# Patient Record
Sex: FEMALE | Race: WHITE
[De-identification: names, ages, dates, MRNs, and addresses within clinical notes are randomized per-mention and may not be internally consistent; named-entity substitution may affect disease eponyms.]

---

## 2019-10-27 ENCOUNTER — HOSPITAL ENCOUNTER (EMERGENCY)
Dept: HOSPITAL 62 - ER | Age: 64
Discharge: HOME | End: 2019-10-27
Payer: COMMERCIAL

## 2019-10-27 VITALS — DIASTOLIC BLOOD PRESSURE: 83 MMHG | SYSTOLIC BLOOD PRESSURE: 140 MMHG

## 2019-10-27 DIAGNOSIS — Z79.82: ICD-10-CM

## 2019-10-27 DIAGNOSIS — Z79.84: ICD-10-CM

## 2019-10-27 DIAGNOSIS — F17.200: ICD-10-CM

## 2019-10-27 DIAGNOSIS — E11.9: ICD-10-CM

## 2019-10-27 DIAGNOSIS — R04.0: Primary | ICD-10-CM

## 2019-10-27 DIAGNOSIS — I50.9: ICD-10-CM

## 2019-10-27 DIAGNOSIS — Z88.2: ICD-10-CM

## 2019-10-27 DIAGNOSIS — I11.0: ICD-10-CM

## 2019-10-27 LAB
ADD MANUAL DIFF: NO
APTT BLD: 32.4 SEC (ref 23.5–35.8)
BASOPHILS # BLD AUTO: 0 10^3/UL (ref 0–0.2)
BASOPHILS NFR BLD AUTO: 0.4 % (ref 0–2)
EOSINOPHIL # BLD AUTO: 0.1 10^3/UL (ref 0–0.6)
EOSINOPHIL NFR BLD AUTO: 1.3 % (ref 0–6)
ERYTHROCYTE [DISTWIDTH] IN BLOOD BY AUTOMATED COUNT: 13.5 % (ref 11.5–14)
HCT VFR BLD CALC: 35.5 % (ref 36–47)
HGB BLD-MCNC: 11.7 G/DL (ref 12–15.5)
INR PPP: 0.93
LYMPHOCYTES # BLD AUTO: 3.2 10^3/UL (ref 0.5–4.7)
LYMPHOCYTES NFR BLD AUTO: 39.1 % (ref 13–45)
MCH RBC QN AUTO: 27.1 PG (ref 27–33.4)
MCHC RBC AUTO-ENTMCNC: 33 G/DL (ref 32–36)
MCV RBC AUTO: 82 FL (ref 80–97)
MONOCYTES # BLD AUTO: 0.7 10^3/UL (ref 0.1–1.4)
MONOCYTES NFR BLD AUTO: 8.3 % (ref 3–13)
NEUTROPHILS # BLD AUTO: 4.1 10^3/UL (ref 1.7–8.2)
NEUTS SEG NFR BLD AUTO: 50.9 % (ref 42–78)
PLATELET # BLD: 296 10^3/UL (ref 150–450)
PROTHROMBIN TIME: 12.5 SEC (ref 11.4–15.4)
RBC # BLD AUTO: 4.33 10^6/UL (ref 3.72–5.28)
TOTAL CELLS COUNTED % (AUTO): 100 %
WBC # BLD AUTO: 8.1 10^3/UL (ref 4–10.5)

## 2019-10-27 PROCEDURE — 85025 COMPLETE CBC W/AUTO DIFF WBC: CPT

## 2019-10-27 PROCEDURE — 36415 COLL VENOUS BLD VENIPUNCTURE: CPT

## 2019-10-27 PROCEDURE — 85610 PROTHROMBIN TIME: CPT

## 2019-10-27 PROCEDURE — 85730 THROMBOPLASTIN TIME PARTIAL: CPT

## 2019-10-27 PROCEDURE — 99283 EMERGENCY DEPT VISIT LOW MDM: CPT

## 2019-10-27 NOTE — ER DOCUMENT REPORT
ED ENT





- General


Chief Complaint: Nose Bleed


Stated Complaint: NOSE BLEED


Time Seen by Provider: 10/27/19 14:32


Notes: 





Patient is a 64-year-old female presents to the emergency department for a left 

nares epistaxis.  Patient states she has had "left nostril septal nose bleeds 

since I was 13 years old!"  States she does have home nasal clamps and "bleed-

EZ" which is an over-the-counter marketed product to stop bleeding. 


Stated she used both this afternoon when her nose started bleeding. Stated she 

could not get it to stop so 911 was called. Stated that once EMS arrived the 

bleeding stopped. Stated she was bleeding for apx 30 minutes. Stated she there 

were blood clots coming out of her mouth as well which is why she wanted to 

still be seen in the ED.


Stated she has not seen an ENT in this area. 


Patient denies headache, lightheadedness, dizziness, weakness, chest pain, 

shortness of breath.





PMH: CHF, DM, HTN, hyperlipidemia, anemia


Medications: Metoprolol, metformin, Januvia, aspirin, "some sort of statin."


Allergies: Sulfa





- Related Data


Allergies/Adverse Reactions: 


                                        





Sulfa (Sulfonamide Antibiotics) Allergy (Verified 10/27/19 14:32)


   











Past Medical History





- General


Information source: Patient





- Social History


Smoking Status: Current Every Day Smoker


Chew tobacco use (# tins/day): No


Frequency of alcohol use: Occasional


Drug Abuse: None


Family History: Reviewed & Not Pertinent


Patient has suicidal ideation: No


Patient has homicidal ideation: No





- Past Medical History


Cardiac Medical History: Reports: Hx Congestive Heart Failure, Hx Hypertension


Endocrine Medical History: Reports: Hx Diabetes Mellitus Type 2


Psychiatric Medical History: Reports: Hx Depression - and anxiety





Review of Systems





- Review of Systems


Constitutional: denies: Fever


EENT: See HPI


Cardiovascular: No symptoms reported.  denies: Syncope, Dizziness, Lightheaded


Respiratory: denies: Short of breath, Wheezing


Gastrointestinal: denies: Vomiting


Genitourinary: No symptoms reported


Female Genitourinary: No symptoms reported


Musculoskeletal: No symptoms reported


Skin: No symptoms reported


Hematologic/Lymphatic: No symptoms reported


Neurological/Psychological: No symptoms reported





Physical Exam





- Vital signs


Vitals: 


                                        











Temp Pulse Resp BP Pulse Ox


 


 97.9 F   85   20   168/90 H  100 


 


 10/27/19 14:31  10/27/19 14:31  10/27/19 14:31  10/27/19 14:31  10/27/19 14:31














- Notes


Notes: 





GENERAL: Alert, interacts well. No acute distress.


HEAD: Normocephalic, atraumatic.


EYES: Pupils equal, round, and reactive to light. Extraocular movements intact.


ENT: Oral mucosa moist, tongue midline.  There does appear to be a clot noted on

the left septum. No obvious bleeding at this time. Nares patent, patient's 

pharynx is within normal limits.   


NECK: Full range of motion. Supple. Trachea midline.


LUNGS: Clear to auscultation bilaterally, no wheezes, rales, or rhonchi. No 

respiratory distress.


HEART: Regular rate and rhythm. No murmur


ABDOMEN: Soft, non-tender. Non-distended. Bowel sounds present in all 4 

quadrants.


EXTREMITIES: Moves all 4 extremities spontaneously. No edema, normal radial and 

dorsalis pedis pulses bilaterally. No cyanosis.


BACK: no cervical, thoracic, lumbar midline tenderness. No saddle anesthesia, 

normal distal neurovascular exam. 


NEUROLOGICAL: Alert and oriented x3. Normal speech. cranial nerves II through 

XII grossly intact 


PSYCH: Normal affect, normal mood.


SKIN: Warm, dry, normal turgor. No rashes or lesions noted.








Course





- Re-evaluation


Re-evalutation: 





10/27/19 15:38





Laboratory











  10/27/19 10/27/19





  14:53 14:53


 


WBC  8.1 


 


RBC  4.33 


 


Hgb  11.7 L 


 


Hct  35.5 L 


 


MCV  82 


 


MCH  27.1 


 


MCHC  33.0 


 


RDW  13.5 


 


Plt Count  296 


 


Lymph % (Auto)  39.1 


 


Mono % (Auto)  8.3 


 


Eos % (Auto)  1.3 


 


Baso % (Auto)  0.4 


 


Absolute Neuts (auto)  4.1 


 


Absolute Lymphs (auto)  3.2 


 


Absolute Monos (auto)  0.7 


 


Absolute Eos (auto)  0.1 


 


Absolute Basos (auto)  0.0 


 


Seg Neutrophils %  50.9 


 


PT   12.5


 


INR   0.93


 


APTT   32.4








Patient's labs are not alarming.  Her hemoglobin is noted to be 11.7 but patient

voices she knows she has slight anemia.


Afrin has not been given yet, Pt wished to go buy Afrin OTC. I have talked about

risk vs benefits of Afrin use with patient at bedside.  Discussed to not using 

it for prolonged period of time.  Also discussed close follow-up with her ENT.





Patient has continued without any epistaxis while in the emergency department.  

Her blood pressure has also come down.  Patient stable for discharge.





- Vital Signs


Vital signs: 


                                        











Temp Pulse Resp BP Pulse Ox


 


 97.9 F   85   20   168/90 H  100 


 


 10/27/19 14:31  10/27/19 14:31  10/27/19 14:31  10/27/19 14:31  10/27/19 14:31














- Laboratory


Result Diagrams: 


                                 10/27/19 14:53





Laboratory results interpreted by me: 


                                        











  10/27/19





  14:53


 


Hgb  11.7 L


 


Hct  35.5 L














Discharge





- Discharge


Clinical Impression: 


 Epistaxis





Condition: Stable


Disposition: HOME, SELF-CARE


Instructions:  Nosebleed Instructions ([facility])


Additional Instructions: 


As we discussed you have been seen and treated in the emergency department for a

nosebleed.  Please attempt to not blow, pick your nose for the next 24 hours.  

This could dislodge any clot that is formed.  You can also buy over-the-counter 

Afrin.  Please use 1 spray in each nostril only when you are having a nosebleed.

 Please follow-up with your primary care provider, ENT provider which will be in

this packet.  Please return to the emergency department for any concerns.


Forms:  Return to Work


Referrals: 


CHYNA BOSE DO [ASSOCIATE] - Follow up as needed